# Patient Record
Sex: MALE | Race: BLACK OR AFRICAN AMERICAN | NOT HISPANIC OR LATINO | Employment: UNEMPLOYED | URBAN - METROPOLITAN AREA
[De-identification: names, ages, dates, MRNs, and addresses within clinical notes are randomized per-mention and may not be internally consistent; named-entity substitution may affect disease eponyms.]

---

## 2020-08-09 ENCOUNTER — NURSE TRIAGE (OUTPATIENT)
Dept: NURSING | Facility: CLINIC | Age: 40
End: 2020-08-09

## 2022-05-04 ENCOUNTER — HOSPITAL ENCOUNTER (EMERGENCY)
Facility: CLINIC | Age: 42
Discharge: HOME | End: 2022-05-04
Attending: EMERGENCY MEDICINE | Admitting: EMERGENCY MEDICINE
Payer: COMMERCIAL

## 2022-05-04 VITALS
RESPIRATION RATE: 25 BRPM | DIASTOLIC BLOOD PRESSURE: 80 MMHG | SYSTOLIC BLOOD PRESSURE: 104 MMHG | OXYGEN SATURATION: 96 % | HEART RATE: 99 BPM

## 2022-05-04 DIAGNOSIS — T40.601A OPIATE OVERDOSE, ACCIDENTAL OR UNINTENTIONAL, INITIAL ENCOUNTER (H): ICD-10-CM

## 2022-05-04 LAB
ALBUMIN SERPL-MCNC: 3.7 G/DL (ref 3.4–5)
ALP SERPL-CCNC: 81 U/L (ref 40–150)
ALT SERPL W P-5'-P-CCNC: 34 U/L (ref 0–70)
ANION GAP SERPL CALCULATED.3IONS-SCNC: 7 MMOL/L (ref 3–14)
APAP SERPL-MCNC: <2 MG/L (ref 10–30)
AST SERPL W P-5'-P-CCNC: 20 U/L (ref 0–45)
ATRIAL RATE - MUSE: 102 BPM
BILIRUB SERPL-MCNC: 0.6 MG/DL (ref 0.2–1.3)
BUN SERPL-MCNC: 16 MG/DL (ref 7–30)
CALCIUM SERPL-MCNC: 8.8 MG/DL (ref 8.5–10.1)
CHLORIDE BLD-SCNC: 109 MMOL/L (ref 94–109)
CO2 SERPL-SCNC: 25 MMOL/L (ref 20–32)
CREAT SERPL-MCNC: 1.48 MG/DL (ref 0.66–1.25)
DIASTOLIC BLOOD PRESSURE - MUSE: NORMAL MMHG
ERYTHROCYTE [DISTWIDTH] IN BLOOD BY AUTOMATED COUNT: 12.5 % (ref 10–15)
GFR SERPL CREATININE-BSD FRML MDRD: 61 ML/MIN/1.73M2
GLUCOSE BLD-MCNC: 177 MG/DL (ref 70–99)
GLUCOSE BLDC GLUCOMTR-MCNC: 166 MG/DL (ref 70–99)
HCT VFR BLD AUTO: 51.4 % (ref 40–53)
HGB BLD-MCNC: 16 G/DL (ref 13.3–17.7)
INTERPRETATION ECG - MUSE: NORMAL
MCH RBC QN AUTO: 30.7 PG (ref 26.5–33)
MCHC RBC AUTO-ENTMCNC: 31.1 G/DL (ref 31.5–36.5)
MCV RBC AUTO: 99 FL (ref 78–100)
P AXIS - MUSE: 43 DEGREES
PLATELET # BLD AUTO: 291 10E3/UL (ref 150–450)
POTASSIUM BLD-SCNC: 3.5 MMOL/L (ref 3.4–5.3)
PR INTERVAL - MUSE: 164 MS
PROT SERPL-MCNC: 8.2 G/DL (ref 6.8–8.8)
QRS DURATION - MUSE: 94 MS
QT - MUSE: 366 MS
QTC - MUSE: 477 MS
R AXIS - MUSE: -48 DEGREES
RBC # BLD AUTO: 5.22 10E6/UL (ref 4.4–5.9)
SALICYLATES SERPL-MCNC: 3 MG/DL
SODIUM SERPL-SCNC: 141 MMOL/L (ref 133–144)
SYSTOLIC BLOOD PRESSURE - MUSE: NORMAL MMHG
T AXIS - MUSE: 14 DEGREES
TROPONIN I SERPL HS-MCNC: 11 NG/L
VENTRICULAR RATE- MUSE: 102 BPM
WBC # BLD AUTO: 16.8 10E3/UL (ref 4–11)

## 2022-05-04 PROCEDURE — 36415 COLL VENOUS BLD VENIPUNCTURE: CPT | Performed by: EMERGENCY MEDICINE

## 2022-05-04 PROCEDURE — 82374 ASSAY BLOOD CARBON DIOXIDE: CPT | Performed by: EMERGENCY MEDICINE

## 2022-05-04 PROCEDURE — 85014 HEMATOCRIT: CPT | Performed by: EMERGENCY MEDICINE

## 2022-05-04 PROCEDURE — 82947 ASSAY GLUCOSE BLOOD QUANT: CPT | Performed by: EMERGENCY MEDICINE

## 2022-05-04 PROCEDURE — 85007 BL SMEAR W/DIFF WBC COUNT: CPT | Performed by: EMERGENCY MEDICINE

## 2022-05-04 PROCEDURE — 96360 HYDRATION IV INFUSION INIT: CPT | Mod: 59

## 2022-05-04 PROCEDURE — 84484 ASSAY OF TROPONIN QUANT: CPT | Performed by: EMERGENCY MEDICINE

## 2022-05-04 PROCEDURE — 99291 CRITICAL CARE FIRST HOUR: CPT | Mod: 25

## 2022-05-04 PROCEDURE — 99292 CRITICAL CARE ADDL 30 MIN: CPT

## 2022-05-04 PROCEDURE — 31500 INSERT EMERGENCY AIRWAY: CPT

## 2022-05-04 PROCEDURE — 258N000003 HC RX IP 258 OP 636: Performed by: EMERGENCY MEDICINE

## 2022-05-04 PROCEDURE — 80179 DRUG ASSAY SALICYLATE: CPT | Performed by: EMERGENCY MEDICINE

## 2022-05-04 PROCEDURE — 80143 DRUG ASSAY ACETAMINOPHEN: CPT | Performed by: EMERGENCY MEDICINE

## 2022-05-04 PROCEDURE — 82435 ASSAY OF BLOOD CHLORIDE: CPT | Performed by: EMERGENCY MEDICINE

## 2022-05-04 PROCEDURE — 93005 ELECTROCARDIOGRAM TRACING: CPT | Mod: 59

## 2022-05-04 RX ORDER — NALOXONE HYDROCHLORIDE 1 MG/ML
2 INJECTION INTRAMUSCULAR; INTRAVENOUS; SUBCUTANEOUS ONCE
Status: DISCONTINUED | OUTPATIENT
Start: 2022-05-04 | End: 2022-05-05 | Stop reason: HOSPADM

## 2022-05-04 RX ORDER — ONDANSETRON 4 MG/1
4 TABLET, ORALLY DISINTEGRATING ORAL ONCE
Status: DISCONTINUED | OUTPATIENT
Start: 2022-05-04 | End: 2022-05-05 | Stop reason: HOSPADM

## 2022-05-04 RX ADMIN — SODIUM CHLORIDE 1000 ML: 9 INJECTION, SOLUTION INTRAVENOUS at 14:35

## 2022-05-04 NOTE — ED TRIAGE NOTES
Pt found unresponsive in car outside of ER   3 doses of Narcan given in car   Pt pulled out of car by staff onto cart  2 doses of 2mg IVP of Narcan given in the stab 1

## 2022-05-04 NOTE — ED NOTES
Pt awake wanting the oxygen and  monitors off wanting to leave - security at bedside - Dr. Adamson at bedside patient to be placed on hold

## 2022-05-04 NOTE — ED PROVIDER NOTES
History   Chief Complaint:  Loss of Consciousness       The history is limited by the condition of the patient.      Monty Nelson is a 41 year old male who presents with loss of consciousness. The patient was found unresponsive in his car outside of the ED. Staff gave him 3 doses of Narcan in his car.     Review of Systems   Unable to perform ROS: Patient unresponsive     Allergies:  The patient has no known allergies.    Medications:  The patient is not currently taking any medications    Past Medical History:     MRSA  Hypertension    Past Surgical History:    The patient denies past surgical history.     Family History:    Father: Hepatitis from blood transfusion  Sister: Thyroid disease    Social History:  The patient presents alone. Girlfriend drove him here after being released from correction.    Physical Exam     Patient Vitals for the past 24 hrs:   BP Pulse Resp SpO2   05/04/22 1435 -- -- -- 96 %   05/04/22 1430 104/80 99 -- --   05/04/22 1425 (!) 151/103 100 25 99 %   05/04/22 1421 (!) 150/107 102 (!) 44 100 %   05/04/22 1418 -- 105 (!) 40 97 %       Physical Exam  Vitals and nursing note reviewed.   Constitutional:       Appearance: He is obese.      Comments: Diaphoretic unresponsive bradycardia apneic.   HENT:      Nose: Nose normal.      Mouth/Throat:      Mouth: Mucous membranes are moist.   Eyes:      Pupils: Pupils are equal, round, and reactive to light.   Cardiovascular:      Rate and Rhythm: Normal rate and regular rhythm.   Pulmonary:      Comments: Decrease in rhonchorous breath sounds.  Abdominal:      General: Abdomen is flat.      Palpations: Abdomen is soft.   Musculoskeletal:         General: Normal range of motion.   Skin:     General: Skin is warm.      Capillary Refill: Capillary refill takes less than 2 seconds.   Neurological:      Mental Status: He is alert.      Comments: Unresponsive, diaphoretic.   Psychiatric:      Comments: Difficult to assess due to coma           Emergency  Department Course   ECG  ECG obtained at 1421, ECG read at 1450  Sinus tachycardia  Pulmonary disease pattern  Left anterior fascicular block  Minimal voltage criteria for LVH, may be normal variant  Abnormal ECG  Rate 102 bpm. NY interval 164 ms. QRS duration 94 ms. QT/QTc 366/477 ms. P-R-T axes 43 -48 14.     Laboratory:  Labs Ordered and Resulted from Time of ED Arrival to Time of ED Departure   GLUCOSE BY METER - Abnormal       Result Value    GLUCOSE BY METER POCT 166 (*)    COMPREHENSIVE METABOLIC PANEL - Abnormal    Sodium 141      Potassium 3.5      Chloride 109      Carbon Dioxide (CO2) 25      Anion Gap 7      Urea Nitrogen 16      Creatinine 1.48 (*)     Calcium 8.8      Glucose 177 (*)     Alkaline Phosphatase 81      AST 20      ALT 34      Protein Total 8.2      Albumin 3.7      Bilirubin Total 0.6      GFR Estimate 61     ACETAMINOPHEN LEVEL - Abnormal    Acetaminophen <2 (*)    CBC WITH PLATELETS AND DIFFERENTIAL - Abnormal    WBC Count 16.8 (*)     RBC Count 5.22      Hemoglobin 16.0      Hematocrit 51.4      MCV 99      MCH 30.7      MCHC 31.1 (*)     RDW 12.5      Platelet Count 291     DIFFERENTIAL - Abnormal    % Neutrophils 42      % Lymphocytes 51      % Monocytes 6      % Eosinophils 1      % Basophils 0      Absolute Neutrophils 7.1      Absolute Lymphocytes 8.6 (*)     Absolute Monocytes 1.0      Absolute Eosinophils 0.2      Absolute Basophils 0.0      RBC Morphology Confirmed RBC Indices      Platelet Assessment        Value: Automated Count Confirmed. Platelet morphology is normal.    Pathologist Review Comments       TROPONIN I - Normal    Troponin I High Sensitivity 11     SALICYLATE LEVEL - Normal    Salicylate 3     BLOOD GAS VENOUS          Emergency Department Course:  Reviewed:  I reviewed nursing notes, vitals, past medical history and Care Everywhere    Assessments:  1405 Patient found unresponsive in his car outside of the ED. Girlfriend driving the car. 3 doses narcan  "given.  1412 Patient arrives in stab room.  1413 Pulse obtained. 30 of vivien, 100 of etomidate  1414 2 x 2 mg Narcan IV  1414 Pulse 101, Blood sugar 166  1415 Intubation started  1416 Patient responding to commands, can say name  1417 Patient sat up, awake and alert.  1428 Patient started taking off cords, becoming angry. I discussed with him why he needs to stay in the ED.  1540 I rechecked the patient.    Interventions:  1435 NS 1L IV Bolus    Disposition:  The patient was discharged to home.     Impression & Plan     CMS Diagnoses: None    Medical Decision Making:  Patient presents with coma found unresponsive in triage.  Patient was given 2 sprays of Narcan on route to the stable room without response.  On arrival patient is diaphoretic and unresponsive suspect opiate toxidrome.  IV established a total of 12 mg of Narcan was given with cup with response patient awoke and was awake and talking and stated \"I want to get the F out of here\".  Patient was observed for more than 2 and half to 3 hours.  Patient continued to require no more Narcan.  Patient was belligerent and stating he wanted to leave patient was more concerned about his car and his cell phone than the fact that he nearly  from opiate overdose.  Patient was explained his clinical condition on multiple occasions and was asked to stay to be observed.  After 2-1/2 hours without need for Narcan and patient's ongoing belligerence and request to leave patient was discharged in stable condition.  Patient stated he just got out of skilled nursing and \"never uses drugs\".  The differential diagnosis of response to Narcan is opiate overdose opiate overdose and opiate overdose.  Not sure how the patient received this if he refused recounts his ability to overdose on drugs but ultimately responded to Narcan his life was saved and he was discharged in guarded condition due to patient's unwillingness to admit to severe drug abuse and severe opiate overdose.    Critical Care " Time: was 20 minutes for this patient excluding procedures    Diagnosis:    ICD-10-CM    1. Opiate overdose, accidental or unintentional, initial encounter (H)  T40.601A        Discharge Medications:  Discharge Medication List as of 5/4/2022  5:42 PM      START taking these medications    Details   naloxone (NARCAN) 4 MG/0.1ML nasal spray Spray 1 spray (4 mg) into one nostril alternating nostrils as needed for opioid reversal every 2-3 minutes until assistance arrives, Disp-0.2 mL, R-0, Local Print             Scribe Disclosure:  Angelia OCONNOR, am serving as a scribe at 2:21 PM on 5/4/2022 to document services personally performed by Jun Adamson MD based on my observations and the provider's statements to me.     Jun Adamson MD  05/05/22 0712

## 2022-05-04 NOTE — ED NOTES
Pt refusing to have any more blood work drawn   Pt demanding to have IO removed from his right leg that was placed when pt was unresponsive

## 2022-05-04 NOTE — DISCHARGE INSTRUCTIONS
You came in unresponsive and you responded to Narcan.  Our concerns are about opiate overdose.  If you choose to abuse opiates please use with a sober friend or have a Narcan pen available to administer if you stop breathing.

## 2022-05-05 LAB
BASOPHILS # BLD MANUAL: 0 10E3/UL (ref 0–0.2)
BASOPHILS NFR BLD MANUAL: 0 %
EOSINOPHIL # BLD MANUAL: 0.2 10E3/UL (ref 0–0.7)
EOSINOPHIL NFR BLD MANUAL: 1 %
LYMPHOCYTES # BLD MANUAL: 8.6 10E3/UL (ref 0.8–5.3)
LYMPHOCYTES NFR BLD MANUAL: 51 %
MONOCYTES # BLD MANUAL: 1 10E3/UL (ref 0–1.3)
MONOCYTES NFR BLD MANUAL: 6 %
NEUTROPHILS # BLD MANUAL: 7.1 10E3/UL (ref 1.6–8.3)
NEUTROPHILS NFR BLD MANUAL: 42 %
PATH REV: ABNORMAL
PLAT MORPH BLD: ABNORMAL
RBC MORPH BLD: ABNORMAL

## 2022-08-03 NOTE — TELEPHONE ENCOUNTER
Mother is trying to locate her son. She states he was brought in for a back injury.   He was in [place] at the time of the injury.   Given phone number for [facility] in [facility].     Leila Ray RN Triage Nurse Advisor  
26-Jul-2021